# Patient Record
Sex: FEMALE | Race: BLACK OR AFRICAN AMERICAN | NOT HISPANIC OR LATINO | URBAN - METROPOLITAN AREA
[De-identification: names, ages, dates, MRNs, and addresses within clinical notes are randomized per-mention and may not be internally consistent; named-entity substitution may affect disease eponyms.]

---

## 2018-06-25 PROBLEM — 38341003 HYPERTENSION: Status: ACTIVE | Noted: 2018-06-25

## 2018-06-25 PROBLEM — 386068004 DISORDER OF VITAMIN D: Status: ACTIVE | Noted: 2018-06-25

## 2018-07-13 PROBLEM — 235856003 LIVER DISEASE: Status: ACTIVE | Noted: 2018-07-13

## 2021-08-28 ENCOUNTER — TELEPHONE ENCOUNTER (OUTPATIENT)
Dept: URBAN - METROPOLITAN AREA CLINIC 13 | Facility: CLINIC | Age: 56
End: 2021-08-28

## 2021-08-29 ENCOUNTER — TELEPHONE ENCOUNTER (OUTPATIENT)
Dept: URBAN - METROPOLITAN AREA CLINIC 13 | Facility: CLINIC | Age: 56
End: 2021-08-29

## 2021-08-29 RX ORDER — ERGOCALCIFEROL 1.25 MG/1
CAPSULE ORAL
Status: ACTIVE | COMMUNITY

## 2021-08-29 RX ORDER — CHLORHEXIDINE GLUCONATE 4 %
LIQUID (ML) TOPICAL
Status: ACTIVE | COMMUNITY

## 2021-08-29 RX ORDER — CALCIUM POLYCARBOPHIL 625 MG
TABLET ORAL
Status: ACTIVE | COMMUNITY
Start: 2018-08-28

## 2021-08-29 RX ORDER — SODIUM PICOSULFATE, MAGNESIUM OXIDE, AND ANHYDROUS CITRIC ACID 10; 3.5; 12 MG/160ML; G/160ML; G/160ML
LIQUID ORAL
Status: ACTIVE | COMMUNITY
Start: 2018-07-03

## 2025-01-09 ENCOUNTER — OFFICE VISIT (OUTPATIENT)
Dept: URBAN - METROPOLITAN AREA CLINIC 48 | Facility: CLINIC | Age: 60
End: 2025-01-09
Payer: COMMERCIAL

## 2025-01-09 ENCOUNTER — LAB OUTSIDE AN ENCOUNTER (OUTPATIENT)
Dept: URBAN - METROPOLITAN AREA CLINIC 48 | Facility: CLINIC | Age: 60
End: 2025-01-09

## 2025-01-09 ENCOUNTER — DASHBOARD ENCOUNTERS (OUTPATIENT)
Age: 60
End: 2025-01-09

## 2025-01-09 VITALS
HEART RATE: 93 BPM | HEIGHT: 68 IN | BODY MASS INDEX: 28.31 KG/M2 | DIASTOLIC BLOOD PRESSURE: 87 MMHG | SYSTOLIC BLOOD PRESSURE: 142 MMHG | TEMPERATURE: 97.9 F | WEIGHT: 186.8 LBS

## 2025-01-09 DIAGNOSIS — K58.1 IRRITABLE BOWEL SYNDROME WITH CONSTIPATION: ICD-10-CM

## 2025-01-09 DIAGNOSIS — K92.1 MELENA: ICD-10-CM

## 2025-01-09 DIAGNOSIS — K62.5 RECTAL BLEEDING: ICD-10-CM

## 2025-01-09 DIAGNOSIS — K64.8 OTHER HEMORRHOIDS: ICD-10-CM

## 2025-01-09 DIAGNOSIS — R79.89 ELEVATED LFTS: ICD-10-CM

## 2025-01-09 PROBLEM — 440630006: Status: ACTIVE | Noted: 2025-01-09

## 2025-01-09 PROCEDURE — 99204 OFFICE O/P NEW MOD 45 MIN: CPT | Performed by: INTERNAL MEDICINE

## 2025-01-09 RX ORDER — TENAPANOR HYDROCHLORIDE 53.2 MG/1
1 TABLET IMMEDIATELY BEFORE MEALS TABLET ORAL TWICE A DAY
Qty: 60 | Refills: 3 | OUTPATIENT
Start: 2025-01-09 | End: 2025-05-09

## 2025-01-09 RX ORDER — HYDROCORTISONE 25 MG/G
1 APPLICATION CREAM TOPICAL TWICE A DAY
Qty: 1 | Refills: 2 | OUTPATIENT
Start: 2025-01-09 | End: 2025-02-20

## 2025-01-09 RX ORDER — PANTOPRAZOLE SODIUM 40 MG/1
1 TABLET TABLET, DELAYED RELEASE ORAL ONCE A DAY
Qty: 90 TABLET | Refills: 3 | OUTPATIENT
Start: 2025-01-09

## 2025-01-09 RX ORDER — OLMESARTAN MEDOXOMIL 5 MG/1
AS DIRECTED TABLET, FILM COATED ORAL
Status: ACTIVE | COMMUNITY

## 2025-01-09 RX ORDER — ERGOCALCIFEROL 1.25 MG/1
1 CAPSULE CAPSULE ORAL
Status: ACTIVE | COMMUNITY

## 2025-01-09 NOTE — HPI-TODAY'S VISIT:
59-year-old female presents for evaluation constipation, dark/bloody stools. She describes longstanding hx constipation, which has worsened over the past year. She previously had BM every other day, but now is going 3-5 days without BM. She endorses sensation of incomplete emptying and needs to add perineal pressure to pass stool sometimes. 1 month ago, she saw BRBPR around stool, but this has since resolved. Patient also reports "black stool" over the past month, but claims her stool is now lighter in color. No PO iron or pepto bismol. Associated lower abd pain and bloating, improved with BM. Patient also notes some perianal itching/discomfort with BMs. She tried OTC stool softeners, fibercon (worsened bloating), and Linzess with no relief of symptoms. She has increased dietary fiber without relief. Denies unintentional weight loss. No family hx CRC, polyps. Patient denies heartburn/indigestion but endorses intermittent nausea without vomiting. She previously took Advil every other day, but is now using CBD lotion and only takes NSAIDs 1x/month. Patient had colonoscopy 7/13/2018 which was normal.  Recommended repeat colon in 10 years.  Patient has history liver biopsy in 2015 showing nonspecific lobular inflammation with mild steatosis labs in 2018 with elevated platelets 384, AST 84, ALT 57.  Liver ultrasound showed cholelithiasis, otherwise normal. No known heart, lung, or kidney issues. No pacemaker/defibrillator, home O2, dialysis. No blood thinner use and patient is not diabetic.

## 2025-01-13 ENCOUNTER — TELEPHONE ENCOUNTER (OUTPATIENT)
Dept: URBAN - METROPOLITAN AREA CLINIC 44 | Facility: CLINIC | Age: 60
End: 2025-01-13

## 2025-01-14 ENCOUNTER — TELEPHONE ENCOUNTER (OUTPATIENT)
Dept: URBAN - METROPOLITAN AREA CLINIC 44 | Facility: CLINIC | Age: 60
End: 2025-01-14

## 2025-01-14 ENCOUNTER — LAB OUTSIDE AN ENCOUNTER (OUTPATIENT)
Dept: URBAN - METROPOLITAN AREA CLINIC 44 | Facility: CLINIC | Age: 60
End: 2025-01-14

## 2025-01-14 LAB
A/G RATIO: 1.4
ABSOLUTE BASOPHILS: 63
ABSOLUTE EOSINOPHILS: 234
ABSOLUTE LYMPHOCYTES: 5418
ABSOLUTE MONOCYTES: 1026
ABSOLUTE NEUTROPHILS: 2259
ALBUMIN: 4.6
ALKALINE PHOSPHATASE: 46
ALT (SGPT): 89
AST (SGOT): 102
BASOPHILS: 0.7
BILIRUBIN, TOTAL: 0.4
BUN/CREATININE RATIO: (no result)
BUN: 12
CALCIUM: 9.9
CARBON DIOXIDE, TOTAL: 29
CHLORIDE: 100
CREATININE: 0.75
EGFR: 93
EOSINOPHILS: 2.6
GLOBULIN, TOTAL: 3.2
GLUCOSE: 79
HEMATOCRIT: 42.1
HEMOGLOBIN: 13.7
LYMPHOCYTES: 60.2
MCH: 26.6
MCHC: 32.5
MCV: 81.7
MONOCYTES: 11.4
MPV: 12.7
NEUTROPHILS: 25.1
PLATELET COUNT: 303
POTASSIUM: 4.4
PROTEIN, TOTAL: 7.8
RDW: 14.2
RED BLOOD CELL COUNT: 5.15
SODIUM: 138
WHITE BLOOD CELL COUNT: 9

## 2025-01-16 ENCOUNTER — TELEPHONE ENCOUNTER (OUTPATIENT)
Dept: URBAN - METROPOLITAN AREA CLINIC 48 | Facility: CLINIC | Age: 60
End: 2025-01-16

## 2025-01-17 ENCOUNTER — TELEPHONE ENCOUNTER (OUTPATIENT)
Dept: URBAN - METROPOLITAN AREA CLINIC 44 | Facility: CLINIC | Age: 60
End: 2025-01-17

## 2025-01-22 ENCOUNTER — TELEPHONE ENCOUNTER (OUTPATIENT)
Dept: URBAN - METROPOLITAN AREA CLINIC 48 | Facility: CLINIC | Age: 60
End: 2025-01-22

## 2025-01-23 ENCOUNTER — CLAIMS CREATED FROM THE CLAIM WINDOW (OUTPATIENT)
Dept: URBAN - METROPOLITAN AREA CLINIC 4 | Facility: CLINIC | Age: 60
End: 2025-01-23
Payer: COMMERCIAL

## 2025-01-23 ENCOUNTER — CLAIMS CREATED FROM THE CLAIM WINDOW (OUTPATIENT)
Dept: URBAN - METROPOLITAN AREA SURGERY CENTER 27 | Facility: SURGERY CENTER | Age: 60
End: 2025-01-23
Payer: COMMERCIAL

## 2025-01-23 ENCOUNTER — OFFICE VISIT (OUTPATIENT)
Dept: URBAN - METROPOLITAN AREA SURGERY CENTER 27 | Facility: SURGERY CENTER | Age: 60
End: 2025-01-23

## 2025-01-23 DIAGNOSIS — D13.2 BENIGN NEOPLASM OF DUODENUM: ICD-10-CM

## 2025-01-23 DIAGNOSIS — K92.1 ACUTE MELENA: ICD-10-CM

## 2025-01-23 DIAGNOSIS — D13.2 ADENOMA OF DUODENUM: ICD-10-CM

## 2025-01-23 DIAGNOSIS — K62.5 ANAL BLEEDING: ICD-10-CM

## 2025-01-23 DIAGNOSIS — K29.60 OTHER GASTRITIS WITHOUT BLEEDING: ICD-10-CM

## 2025-01-23 DIAGNOSIS — K31.7 POLYP OF STOMACH AND DUODENUM: ICD-10-CM

## 2025-01-23 DIAGNOSIS — K31.7 BENIGN GASTRIC POLYP: ICD-10-CM

## 2025-01-23 DIAGNOSIS — K29.60 ADENOPAPILLOMATOSIS GASTRICA: ICD-10-CM

## 2025-01-23 DIAGNOSIS — K29.60 EROSIVE GASTRITIS: ICD-10-CM

## 2025-01-23 DIAGNOSIS — B96.81 HELICOBACTER PYLORI [H. PYLORI] AS THE CAUSE OF DISEASES CLASSIFIED ELSEWHERE: ICD-10-CM

## 2025-01-23 DIAGNOSIS — B96.81 BACTERIAL INFECTION DUE TO H. PYLORI: ICD-10-CM

## 2025-01-23 PROCEDURE — 45378 DIAGNOSTIC COLONOSCOPY: CPT | Performed by: INTERNAL MEDICINE

## 2025-01-23 PROCEDURE — 88342 IMHCHEM/IMCYTCHM 1ST ANTB: CPT | Performed by: PATHOLOGY

## 2025-01-23 PROCEDURE — 00813 ANES UPR LWR GI NDSC PX: CPT | Performed by: ANESTHESIOLOGY

## 2025-01-23 PROCEDURE — 43239 EGD BIOPSY SINGLE/MULTIPLE: CPT | Performed by: INTERNAL MEDICINE

## 2025-01-23 PROCEDURE — 88305 TISSUE EXAM BY PATHOLOGIST: CPT | Performed by: PATHOLOGY

## 2025-01-23 RX ORDER — TENAPANOR HYDROCHLORIDE 53.2 MG/1
1 TABLET IMMEDIATELY BEFORE MEALS TABLET ORAL TWICE A DAY
Qty: 60 | Refills: 3 | Status: ACTIVE | COMMUNITY
Start: 2025-01-09 | End: 2025-05-09

## 2025-01-23 RX ORDER — HYDROCORTISONE 25 MG/G
1 APPLICATION CREAM TOPICAL TWICE A DAY
Qty: 1 | Refills: 2 | Status: ACTIVE | COMMUNITY
Start: 2025-01-09 | End: 2025-02-20

## 2025-01-23 RX ORDER — PANTOPRAZOLE SODIUM 40 MG/1
1 TABLET TABLET, DELAYED RELEASE ORAL ONCE A DAY
Qty: 90 TABLET | Refills: 3 | Status: ACTIVE | COMMUNITY
Start: 2025-01-09

## 2025-01-23 RX ORDER — OLMESARTAN MEDOXOMIL 5 MG/1
AS DIRECTED TABLET, FILM COATED ORAL
Status: ACTIVE | COMMUNITY

## 2025-01-23 RX ORDER — ERGOCALCIFEROL 1.25 MG/1
1 CAPSULE CAPSULE ORAL
Status: ACTIVE | COMMUNITY

## 2025-01-25 ENCOUNTER — LAB OUTSIDE AN ENCOUNTER (OUTPATIENT)
Dept: URBAN - METROPOLITAN AREA CLINIC 44 | Facility: CLINIC | Age: 60
End: 2025-01-25

## 2025-01-27 LAB
FERRITIN, SERUM: 49
HBSAG SCREEN: (no result)
HEP A AB, IGM: (no result)
HEP B CORE AB, IGM: (no result)
HEPATITIS C ANTIBODY: (no result)
IRON BIND.CAP.(TIBC): 350
IRON SATURATION: 20
IRON: 70

## 2025-07-29 ENCOUNTER — TELEPHONE ENCOUNTER (OUTPATIENT)
Dept: URBAN - METROPOLITAN AREA CLINIC 48 | Facility: CLINIC | Age: 60
End: 2025-07-29

## 2025-08-06 ENCOUNTER — TELEPHONE ENCOUNTER (OUTPATIENT)
Dept: URBAN - METROPOLITAN AREA CLINIC 48 | Facility: CLINIC | Age: 60
End: 2025-08-06

## 2025-08-06 ENCOUNTER — LAB OUTSIDE AN ENCOUNTER (OUTPATIENT)
Dept: URBAN - METROPOLITAN AREA CLINIC 48 | Facility: CLINIC | Age: 60
End: 2025-08-06

## 2025-08-07 ENCOUNTER — TELEPHONE ENCOUNTER (OUTPATIENT)
Dept: URBAN - METROPOLITAN AREA CLINIC 48 | Facility: CLINIC | Age: 60
End: 2025-08-07

## 2025-08-22 ENCOUNTER — TELEPHONE ENCOUNTER (OUTPATIENT)
Dept: URBAN - METROPOLITAN AREA CLINIC 48 | Facility: CLINIC | Age: 60
End: 2025-08-22